# Patient Record
Sex: FEMALE | Race: WHITE | Employment: PART TIME | URBAN - NONMETROPOLITAN AREA
[De-identification: names, ages, dates, MRNs, and addresses within clinical notes are randomized per-mention and may not be internally consistent; named-entity substitution may affect disease eponyms.]

---

## 2017-08-24 ENCOUNTER — HOSPITAL ENCOUNTER (OUTPATIENT)
Dept: MAMMOGRAPHY | Age: 42
Discharge: HOME | End: 2017-08-24
Payer: COMMERCIAL

## 2017-08-24 DIAGNOSIS — Z12.39 BREAST SCREENING: ICD-10-CM

## 2017-08-24 PROCEDURE — G0202 SCR MAMMO BI INCL CAD: HCPCS

## 2018-02-05 ENCOUNTER — OFFICE VISIT (OUTPATIENT)
Dept: FAMILY MEDICINE CLINIC | Age: 43
End: 2018-02-05
Payer: MEDICAID

## 2018-02-05 VITALS
BODY MASS INDEX: 22.56 KG/M2 | TEMPERATURE: 98.2 F | RESPIRATION RATE: 14 BRPM | SYSTOLIC BLOOD PRESSURE: 98 MMHG | HEART RATE: 60 BPM | WEIGHT: 135.4 LBS | HEIGHT: 65 IN | DIASTOLIC BLOOD PRESSURE: 62 MMHG

## 2018-02-05 DIAGNOSIS — B37.9 CANDIDIASIS: ICD-10-CM

## 2018-02-05 DIAGNOSIS — Z00.00 ROUTINE PHYSICAL EXAMINATION: Primary | ICD-10-CM

## 2018-02-05 PROCEDURE — 99396 PREV VISIT EST AGE 40-64: CPT | Performed by: NURSE PRACTITIONER

## 2018-02-05 RX ORDER — FLUCONAZOLE 200 MG/1
200 TABLET ORAL DAILY
Qty: 1 TABLET | Refills: 0 | Status: SHIPPED | OUTPATIENT
Start: 2018-02-05 | End: 2018-02-19 | Stop reason: SDUPTHER

## 2018-02-05 ASSESSMENT — PATIENT HEALTH QUESTIONNAIRE - PHQ9
SUM OF ALL RESPONSES TO PHQ QUESTIONS 1-9: 0
2. FEELING DOWN, DEPRESSED OR HOPELESS: 0
SUM OF ALL RESPONSES TO PHQ9 QUESTIONS 1 & 2: 0
1. LITTLE INTEREST OR PLEASURE IN DOING THINGS: 0

## 2018-02-05 NOTE — PROGRESS NOTES
Chief Complaint:   Genia Mckinney is a 38 y.o. female who presents for complete physical examination    History of Present Illness:    Chief Complaint   Patient presents with   [address] Maintenance   Topic Date Due    HIV screen  05/29/1988    DTaP/Tdap/Td vaccine (1 - Tdap) 05/29/1992    Cervical cancer screen  05/29/1994    Flu vaccine (1) 09/01/2017    Lipid screen  10/15/2021         Pt here for yearly exam  Does complain of vaginal yeast infection  There is no problem list on file for this patient. No past medical history on file. Past Surgical History:   Procedure Laterality Date    APPENDECTOMY      CYST REMOVAL      ovary    DILATION AND CURETTAGE OF UTERUS         Current Outpatient Prescriptions   Medication Sig Dispense Refill    fluconazole (DIFLUCAN) 200 MG tablet Take 1 tablet by mouth daily for 1 day 1 tablet 0     No current facility-administered medications for this visit.       No Known Allergies    Social History     Social History    Marital status:      Spouse name: N/A    Number of children: N/A    Years of education: N/A     Social History Main Topics    Smoking status: Never Smoker    Smokeless tobacco: Not on file    Alcohol use No    Drug use: No    Sexual activity: Not on file     Other Topics Concern    Not on file     Social History Narrative    No narrative on file     Family History   Problem Relation Age of Onset    Cancer Mother      Bile Duct    Cancer Father      Cancer colon                            Review Of Systems  Skin: no abnormal pigmentation, rash, scaling, itching, masses, hair or nail changes  Eyes: no blurring, diplopia, or eye pain  Ears/Nose/Throat: no hearing loss, tinnitus, vertigo, nosebleed, nasal congestion, rhinorrhea, sore throat  Respiratory: no cough, pleuritic chest pain, dyspnea, or wheezing  Cardiovascular: no angina, MARTIN, orthopnea, PND, palpitations, or claudication  Gastrointestinal: no nausea, vomiting,

## 2018-02-18 ENCOUNTER — TELEPHONE (OUTPATIENT)
Dept: FAMILY MEDICINE CLINIC | Age: 43
End: 2018-02-18

## 2018-02-18 DIAGNOSIS — B37.9 CANDIDIASIS: ICD-10-CM

## 2018-02-19 RX ORDER — FLUCONAZOLE 200 MG/1
200 TABLET ORAL DAILY
Qty: 3 TABLET | Refills: 0 | Status: SHIPPED | OUTPATIENT
Start: 2018-02-19 | End: 2018-02-22

## 2018-08-27 ENCOUNTER — HOSPITAL ENCOUNTER (OUTPATIENT)
Dept: MAMMOGRAPHY | Age: 43
Discharge: HOME | End: 2018-08-27
Payer: COMMERCIAL

## 2018-08-27 DIAGNOSIS — Z12.39 SCREENING BREAST EXAMINATION: ICD-10-CM

## 2018-08-27 PROCEDURE — 77063 BREAST TOMOSYNTHESIS BI: CPT

## 2018-10-13 LAB
ALBUMIN SERPL-MCNC: 4.2 G/DL
ALP BLD-CCNC: 53 U/L
ALT SERPL-CCNC: 16 U/L
ANION GAP SERPL CALCULATED.3IONS-SCNC: 1.6 MMOL/L
AST SERPL-CCNC: 22 U/L
BASOPHILS ABSOLUTE: NORMAL /ΜL
BASOPHILS RELATIVE PERCENT: 0.6 %
BILIRUB SERPL-MCNC: 0.7 MG/DL (ref 0.1–1.4)
BILIRUBIN DIRECT: 0.1 MG/DL
BUN BLDV-MCNC: 19 MG/DL
CALCIUM SERPL-MCNC: 9.4 MG/DL
CHLORIDE BLD-SCNC: 105 MMOL/L
CHOLESTEROL, TOTAL: 238 MG/DL
CHOLESTEROL/HDL RATIO: 1.3
CO2: 26 MMOL/L
CREAT SERPL-MCNC: 0.8 MG/DL
EOSINOPHILS ABSOLUTE: 0.1 /ΜL
EOSINOPHILS RELATIVE PERCENT: 2.1 %
GFR CALCULATED: 78
GLUCOSE BLD-MCNC: 72 MG/DL
HCT VFR BLD CALC: 43.3 % (ref 36–46)
HDLC SERPL-MCNC: 96 MG/DL (ref 35–70)
HEMOGLOBIN: 14.7 G/DL (ref 12–16)
LDL CHOLESTEROL CALCULATED: 128 MG/DL (ref 0–160)
LYMPHOCYTES ABSOLUTE: 1.4 /ΜL
LYMPHOCYTES RELATIVE PERCENT: 34.2 %
MCH RBC QN AUTO: 32.2 PG
MCHC RBC AUTO-ENTMCNC: 34 G/DL
MCV RBC AUTO: 94.6 FL
MONOCYTES ABSOLUTE: 0.4 /ΜL
MONOCYTES RELATIVE PERCENT: 9.9 %
NEUTROPHILS ABSOLUTE: 2.2 /ΜL
NEUTROPHILS RELATIVE PERCENT: 53.2 %
PDW BLD-RTO: 13.5 %
PHOSPHORUS: 3.6 MG/DL
PLATELET # BLD: 150 K/ΜL
PMV BLD AUTO: 11 FL
POTASSIUM SERPL-SCNC: 4.2 MMOL/L
RBC # BLD: 4.58 10^6/ΜL
SODIUM BLD-SCNC: 140 MMOL/L
TOTAL PROTEIN: 6.8
TRIGL SERPL-MCNC: 72 MG/DL
VLDLC SERPL CALC-MCNC: 14 MG/DL
WBC # BLD: 4.1 10^3/ML

## 2019-03-28 ENCOUNTER — TELEPHONE (OUTPATIENT)
Dept: FAMILY MEDICINE CLINIC | Age: 44
End: 2019-03-28

## 2019-04-01 ENCOUNTER — OFFICE VISIT (OUTPATIENT)
Dept: FAMILY MEDICINE CLINIC | Age: 44
End: 2019-04-01
Payer: COMMERCIAL

## 2019-04-01 VITALS
RESPIRATION RATE: 16 BRPM | OXYGEN SATURATION: 99 % | BODY MASS INDEX: 22.16 KG/M2 | DIASTOLIC BLOOD PRESSURE: 68 MMHG | SYSTOLIC BLOOD PRESSURE: 104 MMHG | WEIGHT: 133 LBS | TEMPERATURE: 97.4 F | HEART RATE: 74 BPM | HEIGHT: 65 IN

## 2019-04-01 DIAGNOSIS — Z00.00 ROUTINE PHYSICAL EXAMINATION: Primary | ICD-10-CM

## 2019-04-01 DIAGNOSIS — D69.6 THROMBOCYTOPENIA (HCC): ICD-10-CM

## 2019-04-01 PROCEDURE — 99396 PREV VISIT EST AGE 40-64: CPT | Performed by: NURSE PRACTITIONER

## 2019-04-01 PROCEDURE — 90471 IMMUNIZATION ADMIN: CPT | Performed by: NURSE PRACTITIONER

## 2019-04-01 PROCEDURE — 90715 TDAP VACCINE 7 YRS/> IM: CPT | Performed by: NURSE PRACTITIONER

## 2019-04-01 ASSESSMENT — PATIENT HEALTH QUESTIONNAIRE - PHQ9
SUM OF ALL RESPONSES TO PHQ QUESTIONS 1-9: 0
SUM OF ALL RESPONSES TO PHQ9 QUESTIONS 1 & 2: 0
2. FEELING DOWN, DEPRESSED OR HOPELESS: 0
SUM OF ALL RESPONSES TO PHQ QUESTIONS 1-9: 0
1. LITTLE INTEREST OR PLEASURE IN DOING THINGS: 0

## 2019-04-01 NOTE — PROGRESS NOTES
Narrative    None     Family History   Problem Relation Age of Onset    Cancer Mother         Bile Duct    Cancer Father         Cancer colon                            Review Of Systems  Skin: no abnormal pigmentation, rash, scaling, itching, masses, hair or nail changes  Eyes: no blurring, diplopia, or eye pain  Ears/Nose/Throat: no hearing loss, tinnitus, vertigo, nosebleed, nasal congestion, rhinorrhea, sore throat  Respiratory: no cough, pleuritic chest pain, dyspnea, or wheezing  Cardiovascular: no angina, MARTIN, orthopnea, PND, palpitations, or claudication  Gastrointestinal: no nausea, vomiting, heartburn, diarrhea, constipation, bloating, or abdominal pain  Genitourinary: no urinary urgency, frequency, dysuria, nocturia, hesitancy, or incontinence  Musculoskeletal: no arthritis, arthralgia, myalgia, weakness, or morning stiffness  Neurologic: no paralysis, paresis, paresthesia, seizures, tremors, or headaches  Hematologic/Lymphatic/Immunologic: no anemia, abnormal bleeding/bruising, fever, chills, night sweats, swollen glands, or unexplained weight loss  Endocrine: no heat or cold intolerance and no polyphagia, polydipsia, or polyuria  Current Complaints: reviewed recent labs which has a low platelets and low wBC  This has been present for about 5 years  No known cause. She feels good. Personal Health Questionnaire Reviewed: yes. Objective:       PHYSICAL EXAMINATION:  /68 (Site: Left Upper Arm, Position: Sitting, Cuff Size: Medium Adult)   Pulse 74   Temp 97.4 °F (36.3 °C) (Temporal)   Resp 16   Ht 5' 5\" (1.651 m)   Wt 133 lb (60.3 kg)   SpO2 99%   BMI 22.13 kg/m²   General appearance: healthy, alert, no distress  Skin: Skin color, texture, turgor normal. No rashes or lesions. No induration or tightening palpated. Head: Normocephalic. No masses, lesions, tenderness or abnormalities  Eyes: conjunctivae/corneas clear. PERRL, EOM's intact.  Fundi are normal, no papilledema,

## 2019-04-04 ENCOUNTER — TELEPHONE (OUTPATIENT)
Dept: FAMILY MEDICINE CLINIC | Age: 44
End: 2019-04-04

## 2019-04-04 ENCOUNTER — HOSPITAL ENCOUNTER (OUTPATIENT)
Dept: ULTRASOUND IMAGING | Age: 44
Discharge: HOME | End: 2019-04-04
Payer: COMMERCIAL

## 2019-04-04 DIAGNOSIS — D69.6 THROMBOCYTOPENIA (HCC): Primary | ICD-10-CM

## 2019-04-04 DIAGNOSIS — D69.6 THROMBOCYTOPENIA (HCC): ICD-10-CM

## 2019-04-04 PROCEDURE — 76705 ECHO EXAM OF ABDOMEN: CPT

## 2019-04-25 ENCOUNTER — OFFICE VISIT (OUTPATIENT)
Dept: ONCOLOGY | Age: 44
End: 2019-04-25
Payer: COMMERCIAL

## 2019-04-25 ENCOUNTER — HOSPITAL ENCOUNTER (OUTPATIENT)
Dept: INFUSION THERAPY | Age: 44
Discharge: HOME | End: 2019-04-25
Payer: COMMERCIAL

## 2019-04-25 VITALS
SYSTOLIC BLOOD PRESSURE: 118 MMHG | HEIGHT: 65 IN | DIASTOLIC BLOOD PRESSURE: 57 MMHG | OXYGEN SATURATION: 99 % | RESPIRATION RATE: 16 BRPM | HEART RATE: 75 BPM | BODY MASS INDEX: 22.33 KG/M2 | TEMPERATURE: 98.1 F | WEIGHT: 134 LBS

## 2019-04-25 DIAGNOSIS — D70.8 OTHER NEUTROPENIA (HCC): Primary | ICD-10-CM

## 2019-04-25 DIAGNOSIS — D69.6 THROMBOCYTOPENIA (HCC): ICD-10-CM

## 2019-04-25 PROCEDURE — 99211 OFF/OP EST MAY X REQ PHY/QHP: CPT

## 2019-04-25 PROCEDURE — G8427 DOCREV CUR MEDS BY ELIG CLIN: HCPCS | Performed by: INTERNAL MEDICINE

## 2019-04-25 PROCEDURE — G8420 CALC BMI NORM PARAMETERS: HCPCS | Performed by: INTERNAL MEDICINE

## 2019-04-25 PROCEDURE — 99203 OFFICE O/P NEW LOW 30 MIN: CPT | Performed by: INTERNAL MEDICINE

## 2019-04-25 PROCEDURE — 1036F TOBACCO NON-USER: CPT | Performed by: INTERNAL MEDICINE

## 2019-04-25 NOTE — PATIENT INSTRUCTIONS
1.  Flow cytometry for peripheral blood  2. Dr. Cat Davis will call the patient with report  3.  RTC PRN

## 2019-04-25 NOTE — PROGRESS NOTES
[facility] [address] [place] [address], Drema Franklin Gia Meeksjay Rueda  Dept: [phone]  Dept Fax: [phone]: [phone]    Visit Date:4/25/2019     Kevon Biswas is a 37 y.o. female who presents today for:   Chief Complaint   Patient presents with    New Patient     Thrombocytopenia        HPI: HPI     This is a 49-year-old patient referred to the medical oncology/hematology clinic with new onset mild thrombocytopenia and neutropenia. Her CBC in October 2018 showed normal WBC 4.1, normal hemoglobin 14.7 and normal platelet count 278,164. CBC on March 28, 2019 showed WBC 2.7, ANC  1.6, hemoglobin 13.3 and a platelet count 282. The patient denies any recent infections, she was not placed on any new medications. She denies having any active bleeding. She is not a smoker, denies alcohol intake, no weight loss. History reviewed. No pertinent past medical history. Past Surgical History:   Procedure Laterality Date    APPENDECTOMY      CYST REMOVAL      ovary    DILATION AND CURETTAGE OF UTERUS        Family History   Problem Relation Age of Onset    Cancer Mother         Bile Duct    Cancer Father         Cancer colon      Social History     Tobacco Use    Smoking status: Never Smoker    Smokeless tobacco: Never Used   Substance Use Topics    Alcohol use: No      No current outpatient medications on file. No current facility-administered medications for this visit. No Known Allergies   Health Maintenance   Topic Date Due    Pneumococcal 0-64 years Vaccine (1 of 3 - PCV13) 05/29/1979    HIV screen  05/29/1988    Cervical cancer screen  05/29/1994    Flu vaccine (Season Ended) 09/01/2019    Lipid screen  03/28/2024    DTaP/Tdap/Td vaccine (2 - Td) 04/01/2029        Subjective:   Review of Systems   Constitutional: Negative for activity change, appetite change, fatigue and fever.    HENT: Negative for congestion, dental problem, facial swelling, hearing loss, exercise. Patient agreed with treatment plan. Follow up as directed.     Electronically signed by Clara Galeas MD on 4/25/19 at 2:11 PM

## 2019-04-26 ENCOUNTER — NURSE ONLY (OUTPATIENT)
Dept: LAB | Age: 44
End: 2019-04-26

## 2019-04-26 DIAGNOSIS — D70.4 CYCLICAL NEUTROPENIA (HCC): ICD-10-CM

## 2019-04-28 LAB — LEUK/LYMPH PHENOTYPING WB: NORMAL

## 2019-05-10 ENCOUNTER — TELEPHONE (OUTPATIENT)
Dept: ONCOLOGY | Age: 44
End: 2019-05-10

## 2019-05-14 DIAGNOSIS — D70.8 OTHER NEUTROPENIA (HCC): ICD-10-CM

## 2019-05-14 DIAGNOSIS — D69.6 THROMBOCYTOPENIA (HCC): Primary | ICD-10-CM

## 2019-05-14 ASSESSMENT — ENCOUNTER SYMPTOMS
VOMITING: 0
BLOOD IN STOOL: 0
BACK PAIN: 0
CHEST TIGHTNESS: 0
FACIAL SWELLING: 0
DIARRHEA: 0
COLOR CHANGE: 0
NAUSEA: 0
EYE DISCHARGE: 0
WHEEZING: 0
ABDOMINAL DISTENTION: 0
ABDOMINAL PAIN: 0
RECTAL PAIN: 0
CONSTIPATION: 0
TROUBLE SWALLOWING: 0
COUGH: 0
SORE THROAT: 0
SHORTNESS OF BREATH: 0

## 2019-06-17 ENCOUNTER — NURSE ONLY (OUTPATIENT)
Dept: LAB | Age: 44
End: 2019-06-17

## 2019-06-17 DIAGNOSIS — D70.8 OTHER NEUTROPENIA (HCC): ICD-10-CM

## 2019-06-17 DIAGNOSIS — D69.6 THROMBOCYTOPENIA (HCC): ICD-10-CM

## 2019-06-17 LAB
BASOPHILS # BLD: 0.6 %
BASOPHILS ABSOLUTE: 0 THOU/MM3 (ref 0–0.1)
EOSINOPHIL # BLD: 0.6 %
EOSINOPHILS ABSOLUTE: 0 THOU/MM3 (ref 0–0.4)
ERYTHROCYTE [DISTWIDTH] IN BLOOD BY AUTOMATED COUNT: 13 % (ref 11.5–14.5)
ERYTHROCYTE [DISTWIDTH] IN BLOOD BY AUTOMATED COUNT: 48.6 FL (ref 35–45)
HCT VFR BLD CALC: 41.3 % (ref 37–47)
HEMOGLOBIN: 13.2 GM/DL (ref 12–16)
IMMATURE GRANS (ABS): 0.01 THOU/MM3 (ref 0–0.07)
IMMATURE GRANULOCYTES: 0.1 %
LYMPHOCYTES # BLD: 16.9 %
LYMPHOCYTES ABSOLUTE: 1.1 THOU/MM3 (ref 1–4.8)
MCH RBC QN AUTO: 32.5 PG (ref 26–33)
MCHC RBC AUTO-ENTMCNC: 32 GM/DL (ref 32.2–35.5)
MCV RBC AUTO: 101.7 FL (ref 81–99)
MONOCYTES # BLD: 7.9 %
MONOCYTES ABSOLUTE: 0.5 THOU/MM3 (ref 0.4–1.3)
NUCLEATED RED BLOOD CELLS: 0 /100 WBC
PLATELET # BLD: 163 THOU/MM3 (ref 130–400)
PMV BLD AUTO: 12.8 FL (ref 9.4–12.4)
RBC # BLD: 4.06 MILL/MM3 (ref 4.2–5.4)
SEG NEUTROPHILS: 73.9 %
SEGMENTED NEUTROPHILS ABSOLUTE COUNT: 5 THOU/MM3 (ref 1.8–7.7)
WBC # BLD: 6.7 THOU/MM3 (ref 4.8–10.8)

## 2019-09-11 ENCOUNTER — HOSPITAL ENCOUNTER (OUTPATIENT)
Dept: MAMMOGRAPHY | Age: 44
Discharge: HOME | End: 2019-09-11
Payer: COMMERCIAL

## 2019-09-11 DIAGNOSIS — Z12.39 SCREENING BREAST EXAMINATION: ICD-10-CM

## 2019-09-11 PROCEDURE — 77063 BREAST TOMOSYNTHESIS BI: CPT

## 2019-11-08 ENCOUNTER — NURSE ONLY (OUTPATIENT)
Dept: LAB | Age: 44
End: 2019-11-08

## 2019-11-08 LAB
ALBUMIN SERPL-MCNC: 4.3 G/DL (ref 3.5–5.1)
ALP BLD-CCNC: 63 U/L (ref 38–126)
ALT SERPL-CCNC: 14 U/L (ref 11–66)
ANION GAP SERPL CALCULATED.3IONS-SCNC: 14 MEQ/L (ref 8–16)
AST SERPL-CCNC: 22 U/L (ref 5–40)
BILIRUB SERPL-MCNC: 0.3 MG/DL (ref 0.3–1.2)
BILIRUBIN DIRECT: < 0.2 MG/DL (ref 0–0.3)
BUN BLDV-MCNC: 17 MG/DL (ref 7–22)
CALCIUM SERPL-MCNC: 9.8 MG/DL (ref 8.5–10.5)
CHLORIDE BLD-SCNC: 104 MEQ/L (ref 98–111)
CHOLESTEROL, TOTAL: 207 MG/DL (ref 100–199)
CO2: 24 MEQ/L (ref 23–33)
CREAT SERPL-MCNC: 0.7 MG/DL (ref 0.4–1.2)
ERYTHROCYTE [DISTWIDTH] IN BLOOD BY AUTOMATED COUNT: 13.5 % (ref 11.5–14.5)
ERYTHROCYTE [DISTWIDTH] IN BLOOD BY AUTOMATED COUNT: 49.4 FL (ref 35–45)
GFR SERPL CREATININE-BSD FRML MDRD: 90 ML/MIN/1.73M2
GLUCOSE BLD-MCNC: 100 MG/DL (ref 70–108)
HCT VFR BLD CALC: 41.5 % (ref 37–47)
HDLC SERPL-MCNC: 85 MG/DL
HEMOGLOBIN: 13.4 GM/DL (ref 12–16)
LDL CHOLESTEROL CALCULATED: 108 MG/DL
MCH RBC QN AUTO: 32 PG (ref 26–33)
MCHC RBC AUTO-ENTMCNC: 32.3 GM/DL (ref 32.2–35.5)
MCV RBC AUTO: 99 FL (ref 81–99)
PLATELET # BLD: 170 THOU/MM3 (ref 130–400)
PMV BLD AUTO: 12.5 FL (ref 9.4–12.4)
POTASSIUM SERPL-SCNC: 4.2 MEQ/L (ref 3.5–5.2)
RBC # BLD: 4.19 MILL/MM3 (ref 4.2–5.4)
SODIUM BLD-SCNC: 142 MEQ/L (ref 135–145)
TOTAL PROTEIN: 6.9 G/DL (ref 6.1–8)
TRIGL SERPL-MCNC: 69 MG/DL (ref 0–199)
WBC # BLD: 3 THOU/MM3 (ref 4.8–10.8)

## 2019-11-13 ENCOUNTER — NURSE ONLY (OUTPATIENT)
Dept: LAB | Age: 44
End: 2019-11-13

## 2019-11-29 ENCOUNTER — OFFICE VISIT (OUTPATIENT)
Dept: FAMILY MEDICINE CLINIC | Age: 44
End: 2019-11-29
Payer: COMMERCIAL

## 2019-11-29 ENCOUNTER — TELEPHONE (OUTPATIENT)
Dept: FAMILY MEDICINE CLINIC | Age: 44
End: 2019-11-29

## 2019-11-29 VITALS
TEMPERATURE: 97.2 F | SYSTOLIC BLOOD PRESSURE: 104 MMHG | WEIGHT: 136.4 LBS | HEIGHT: 65 IN | HEART RATE: 64 BPM | BODY MASS INDEX: 22.73 KG/M2 | DIASTOLIC BLOOD PRESSURE: 66 MMHG | RESPIRATION RATE: 12 BRPM

## 2019-11-29 DIAGNOSIS — J02.9 SORE THROAT: Primary | ICD-10-CM

## 2019-11-29 LAB — STREPTOCOCCUS A RNA: NEGATIVE

## 2019-11-29 PROCEDURE — G8420 CALC BMI NORM PARAMETERS: HCPCS | Performed by: NURSE PRACTITIONER

## 2019-11-29 PROCEDURE — G8427 DOCREV CUR MEDS BY ELIG CLIN: HCPCS | Performed by: NURSE PRACTITIONER

## 2019-11-29 PROCEDURE — 1036F TOBACCO NON-USER: CPT | Performed by: NURSE PRACTITIONER

## 2019-11-29 PROCEDURE — G8484 FLU IMMUNIZE NO ADMIN: HCPCS | Performed by: NURSE PRACTITIONER

## 2019-11-29 PROCEDURE — 99213 OFFICE O/P EST LOW 20 MIN: CPT | Performed by: NURSE PRACTITIONER

## 2019-11-29 PROCEDURE — 87651 STREP A DNA AMP PROBE: CPT | Performed by: NURSE PRACTITIONER

## 2019-11-29 RX ORDER — METHYLPREDNISOLONE 4 MG/1
TABLET ORAL
Qty: 1 KIT | Refills: 0 | Status: SHIPPED | OUTPATIENT
Start: 2019-11-29 | End: 2019-12-05

## 2019-11-29 ASSESSMENT — ENCOUNTER SYMPTOMS
GASTROINTESTINAL NEGATIVE: 1
SORE THROAT: 1
RESPIRATORY NEGATIVE: 1
EYES NEGATIVE: 1

## 2020-08-11 ENCOUNTER — PATIENT MESSAGE (OUTPATIENT)
Dept: FAMILY MEDICINE CLINIC | Age: 45
End: 2020-08-11

## 2020-08-11 NOTE — TELEPHONE ENCOUNTER
From: Lowell Gallo  To: , APRN - CNP  Sent: 8/11/2020 7:51 AM EDT  Subject: Non-Urgent Medical Question    Are you doing a days for blood work for a certain price anytime soon?

## 2020-09-14 ENCOUNTER — HOSPITAL ENCOUNTER (OUTPATIENT)
Dept: MAMMOGRAPHY | Age: 45
Discharge: HOME | End: 2020-09-14
Payer: COMMERCIAL

## 2020-09-14 PROCEDURE — 77063 BREAST TOMOSYNTHESIS BI: CPT

## 2020-11-21 ENCOUNTER — PATIENT MESSAGE (OUTPATIENT)
Dept: FAMILY MEDICINE CLINIC | Age: 45
End: 2020-11-21

## 2020-11-22 RX ORDER — DOXYCYCLINE HYCLATE 100 MG
100 TABLET ORAL 2 TIMES DAILY
Qty: 20 TABLET | Refills: 0 | Status: SHIPPED | OUTPATIENT
Start: 2020-11-22 | End: 2020-12-02

## 2020-11-22 NOTE — PROGRESS NOTES
Pt called with complaints of sore throat x 5 days. No fever. No cough. Reports some sinus drainage. Will send in doxycycline for the patient.  She is to follow up with pcp if symptoms persist.

## 2020-11-23 RX ORDER — AZITHROMYCIN 250 MG/1
TABLET, FILM COATED ORAL
Qty: 1 PACKET | Refills: 0 | Status: SHIPPED | OUTPATIENT
Start: 2020-11-23 | End: 2021-03-04 | Stop reason: ALTCHOICE

## 2020-11-23 NOTE — TELEPHONE ENCOUNTER
From: Sloan Crowe  To: , APRN - CNP  Sent: 11/21/2020 6:13 PM EST  Subject: Non-Urgent Medical Question    I have had a sore throat for about 5 days now. Not hardly any other symptoms didnt know if I could get something called in. Been using cough drops and NyQuil. I have been quarantining all this past week just in case and will continue.

## 2021-03-04 ENCOUNTER — OFFICE VISIT (OUTPATIENT)
Dept: FAMILY MEDICINE CLINIC | Age: 46
End: 2021-03-04
Payer: COMMERCIAL

## 2021-03-04 VITALS
BODY MASS INDEX: 18.66 KG/M2 | RESPIRATION RATE: 16 BRPM | HEIGHT: 65 IN | TEMPERATURE: 97.4 F | HEART RATE: 54 BPM | OXYGEN SATURATION: 99 % | DIASTOLIC BLOOD PRESSURE: 62 MMHG | SYSTOLIC BLOOD PRESSURE: 112 MMHG | WEIGHT: 112 LBS

## 2021-03-04 DIAGNOSIS — Z00.00 ROUTINE PHYSICAL EXAMINATION: Primary | ICD-10-CM

## 2021-03-04 PROCEDURE — 99396 PREV VISIT EST AGE 40-64: CPT | Performed by: NURSE PRACTITIONER

## 2021-03-04 PROCEDURE — G8484 FLU IMMUNIZE NO ADMIN: HCPCS | Performed by: NURSE PRACTITIONER

## 2021-03-04 SDOH — ECONOMIC STABILITY: TRANSPORTATION INSECURITY
IN THE PAST 12 MONTHS, HAS THE LACK OF TRANSPORTATION KEPT YOU FROM MEDICAL APPOINTMENTS OR FROM GETTING MEDICATIONS?: NOT ASKED

## 2021-03-04 SDOH — ECONOMIC STABILITY: TRANSPORTATION INSECURITY
IN THE PAST 12 MONTHS, HAS LACK OF TRANSPORTATION KEPT YOU FROM MEETINGS, WORK, OR FROM GETTING THINGS NEEDED FOR DAILY LIVING?: NOT ASKED

## 2021-03-04 ASSESSMENT — PATIENT HEALTH QUESTIONNAIRE - PHQ9
1. LITTLE INTEREST OR PLEASURE IN DOING THINGS: 0
SUM OF ALL RESPONSES TO PHQ9 QUESTIONS 1 & 2: 0
SUM OF ALL RESPONSES TO PHQ QUESTIONS 1-9: 0
SUM OF ALL RESPONSES TO PHQ QUESTIONS 1-9: 0

## 2021-03-10 ENCOUNTER — TELEPHONE (OUTPATIENT)
Dept: ADMINISTRATIVE | Age: 46
End: 2021-03-10

## 2021-03-10 NOTE — TELEPHONE ENCOUNTER
Caller Name: Musa Mullen    Relation to Patient: self    Call Back Phone Number: [phone]    Has the patient received a COVID vaccine? no    If yes, when and where: n/a    Question/Concern: Patient is able to get the vaccine tomorrow but is wanting to have Dr. Jermaine George give her the clearance due to her illnesses and immunity she wants to be sure that it is ok to get it.

## 2021-04-30 NOTE — PROGRESS NOTES
Chief Complaint:   Kaylee Ch is a 50 y.o. female who presents for complete physical examination    History of Present Illness:    Chief Complaint   Patient presents with   [address] Maintenance   Topic Date Due    Cervical cancer screen  Never done    Flu vaccine (1) Never done    Lipid screen  02/25/2025    DTaP/Tdap/Td vaccine (2 - Td) 04/01/2029    Hepatitis A vaccine  Aged Out    Hepatitis B vaccine  Aged Out    Hib vaccine  Aged Out    Meningococcal (ACWY) vaccine  Aged Out    Pneumococcal 0-64 years Vaccine  Aged Out    Hepatitis C screen  Discontinued    HIV screen  Discontinued     Pt here for routine physical.  Overall she feels well. Labs were stable. She has history of leukopenia unchanged. She has lost weight but reports she was in a weight loss challenge. Advised to be careful to not lose more weight       There is no problem list on file for this patient. No past medical history on file. Past Surgical History:   Procedure Laterality Date    APPENDECTOMY      CYST REMOVAL      ovary    DILATION AND CURETTAGE OF UTERUS         No current outpatient medications on file. No current facility-administered medications for this visit.       No Known Allergies    Social History     Socioeconomic History    Marital status:      Spouse name: None    Number of children: None    Years of education: None    Highest education level: None   Occupational History    None   Social Needs    Financial resource strain: Not hard at all   [email or web address] insecurity     Worry: Never true     Inability: Never true   [employer] needs     Medical: None     Non-medical: None   Tobacco Use    Smoking status: Never Smoker    Smokeless tobacco: Never Used   Substance and Sexual Activity    Alcohol use: No    Drug use: No    Sexual activity: None   Lifestyle    Physical activity     Days per week: None     Minutes per session: None    Stress: None   Relationships    Social Left message for pt's mom, Pat, to call back.  The new [employer] form requests more information so pt will need to be seen.     connections     Talks on phone: None     Gets together: None     Attends Religion service: None     Active member of club or organization: None     Attends meetings of clubs or organizations: None     Relationship status: None    Intimate partner violence     Fear of current or ex partner: None     Emotionally abused: None     Physically abused: None     Forced sexual activity: None   Other Topics Concern    None   Social History Narrative    None     Family History   Problem Relation Age of Onset    Cancer Mother         Bile Duct    Cancer Father         Cancer colon                            Review Of Systems  Skin: no abnormal pigmentation, rash, scaling, itching, masses, hair or nail changes  Eyes: no blurring, diplopia, or eye pain  Ears/Nose/Throat: no hearing loss, tinnitus, vertigo, nosebleed, nasal congestion, rhinorrhea, sore throat  Respiratory: no cough, pleuritic chest pain, dyspnea, or wheezing  Cardiovascular: no angina, MARTIN, orthopnea, PND, palpitations, or claudication  Gastrointestinal: no nausea, vomiting, heartburn, diarrhea, constipation, bloating, or abdominal pain  Genitourinary: no urinary urgency, frequency, dysuria, nocturia, hesitancy, or incontinence  Musculoskeletal: no arthritis, arthralgia, myalgia, weakness, or morning stiffness  Neurologic: no paralysis, paresis, paresthesia, seizures, tremors, or headaches  Hematologic/Lymphatic/Immunologic: no anemia, abnormal bleeding/bruising, fever, chills, night sweats, swollen glands, or unexplained weight loss  Endocrine: no heat or cold intolerance and no polyphagia, polydipsia, or polyuria  Current Complaints: . Personal Health Questionnaire Reviewed: yes.           Objective:       PHYSICAL EXAMINATION:  /62 (Site: Right Upper Arm, Position: Sitting, Cuff Size: Medium Adult)   Pulse 54   Temp 97.4 °F (36.3 °C) (Temporal)   Resp 16   Ht 5' 5\" (1.651 m)   Wt 112 lb (50.8 kg)   SpO2 99%   BMI 18.64 kg/m²   General appearance: healthy, alert, no distress  Skin: Skin color, texture, turgor normal. No rashes or lesions. No induration or tightening palpated. Head: Normocephalic. No masses, lesions, tenderness or abnormalities  Eyes: conjunctivae/corneas clear. PERRL, EOM's intact. Fundi are normal, no papilledema, hemorrhages or exudates. No AV crossing changes are noted. Ears: External ears normal. Canals clear. TM's clear bilaterally. Hearing normal to finger rub. Nose/Sinuses: Nares normal. Septum midline. Mucosa normal. No drainage or sinus tenderness. Oropharynx: Lips, mucosa, and tongue normal. Teeth and gums normal. Oropharynx clear with no exudate seen. Neck: Neck supple, and symmetric. Multiple small shoddy nodes in neck. Back: Back symmetric, no curvature. ROM normal. No CVA tenderness. Lungs: Good diaphragmatic excursion. Lungs clear to auscultation bilaterally. No retractions or use of accessory muscles. No tactile fremitus. Normal chest percussion. Heart: PMI is not displaced, and no thrill noted. Regular rate and rhythm, with no rub, murmur or gallop noted. Abdomen: Abdomen soft, non-tender. BS normal. No masses, organomegaly. No hernia noted. Extremities: Extremities normal. No deformities, edema, or skin discoloration. No cyanosis or clubbing noted to the nails. Lymph: No lymphadenopathy of the neck or supraclavicular regions. Musculoskeletal: Spine ROM normal. Muscular strength intact. Peripheral pulses: radial=4/4, femoral=4/4, dorsalis pedis=4/4,  Neuro: Cranial nerves intact, Gait normal. Reflexes normal and symmetric, with no pathologic reflex noted. No focal weakness. Normal sensation to light touch.   No components found for: CHLPL  Lab Results   Component Value Date    TRIG 69 02/25/2020    TRIG 69 11/08/2019    TRIG 74 03/28/2019     Lab Results   Component Value Date    HDL 79 02/25/2020    HDL 85 11/08/2019    HDL 70 03/28/2019     Lab Results   Component Value Date    LDLCALC 108 11/08/2019    LDLCALC 128 10/13/2018    LDLCALC 127 10/15/2016     No results found for: LABVLDL  Lab Results   Component Value Date    WBC 2.6 (L) 02/25/2020    HGB 13.1 02/25/2020    HCT 39.2 02/25/2020    MCV 94.3 02/25/2020     (L) 02/25/2020       Chemistry        Component Value Date/Time     02/25/2020 0725    K 3.7 02/25/2020 0725     02/25/2020 0725    CO2 31 02/25/2020 0725    BUN 14 02/25/2020 0725    CREATININE 0.81 02/25/2020 0725        Component Value Date/Time    CALCIUM 9.50 02/25/2020 0725    ALKPHOS 53 02/25/2020 0725    AST 25 02/25/2020 0725    ALT 20 02/25/2020 0725    BILITOT 0.5 02/25/2020 0725            Assessment:      Diagnosis Orders   1. Routine physical examination            Plan:    Education Reviewed and Recommended: Low Fat, Low Cholesterol Diet, Skin Cancer Screening, Depression Evaluation    Advised on preventative health maintenance guidelines and schedules to be completed. reviewed appropriate vaccines. Pt refused none. Orders per enc. To call with any questions or concerns.

## 2021-05-13 ENCOUNTER — PATIENT MESSAGE (OUTPATIENT)
Dept: FAMILY MEDICINE CLINIC | Age: 46
End: 2021-05-13

## 2021-05-13 RX ORDER — AMOXICILLIN 500 MG/1
500 CAPSULE ORAL 3 TIMES DAILY
Qty: 30 CAPSULE | Refills: 0 | Status: SHIPPED | OUTPATIENT
Start: 2021-05-13 | End: 2021-05-23

## 2021-05-13 NOTE — TELEPHONE ENCOUNTER
Spoke with patient. She states her ST started 5/8, is worse in the morning, hurts to swallow. Denies have cough, vera, drainage, fever, HA, no other symptoms.   Has been taking Advil 400 in am, cough drops and OTC sore throat syrup-all helps a little but ST comes back

## 2021-05-13 NOTE — TELEPHONE ENCOUNTER
From: Lorie Spencer  To: REX Ferrer - CNP  Sent: 5/13/2021 7:37 AM EDT  Subject: Non-Urgent Medical Question    Consuelo Mcgill,    I have had a sore throat all this week and didn't know if there is anything I could get for this. I go to [facility] if there is anything I could get called in or not.     Thanks,    Sreekanth Rolon

## 2021-10-25 ENCOUNTER — HOSPITAL ENCOUNTER (OUTPATIENT)
Dept: MAMMOGRAPHY | Age: 46
Discharge: HOME | End: 2021-10-25
Payer: COMMERCIAL

## 2021-10-25 DIAGNOSIS — Z12.39 SCREENING BREAST EXAMINATION: ICD-10-CM

## 2021-10-25 PROCEDURE — 77063 BREAST TOMOSYNTHESIS BI: CPT

## 2021-10-28 ENCOUNTER — HOSPITAL ENCOUNTER (OUTPATIENT)
Dept: WOMENS IMAGING | Age: 46
Discharge: HOME | End: 2021-10-28
Payer: COMMERCIAL

## 2021-10-28 DIAGNOSIS — R92.2 BREAST DENSITY: ICD-10-CM

## 2021-10-28 PROCEDURE — 76642 ULTRASOUND BREAST LIMITED: CPT

## 2022-10-27 ENCOUNTER — HOSPITAL ENCOUNTER (OUTPATIENT)
Dept: MAMMOGRAPHY | Age: 47
Discharge: HOME | End: 2022-10-27
Payer: COMMERCIAL

## 2022-10-27 DIAGNOSIS — Z12.39 BREAST SCREENING: ICD-10-CM

## 2022-10-27 PROCEDURE — 77067 SCR MAMMO BI INCL CAD: CPT

## 2022-11-29 ENCOUNTER — NURSE ONLY (OUTPATIENT)
Dept: LAB | Age: 47
End: 2022-11-29

## 2023-02-16 ENCOUNTER — PATIENT MESSAGE (OUTPATIENT)
Dept: FAMILY MEDICINE CLINIC | Age: 48
End: 2023-02-16

## 2023-02-16 RX ORDER — AMOXICILLIN 500 MG/1
500 CAPSULE ORAL 3 TIMES DAILY
Qty: 30 CAPSULE | Refills: 0 | Status: SHIPPED | OUTPATIENT
Start: 2023-02-16 | End: 2023-02-26

## 2023-02-16 NOTE — TELEPHONE ENCOUNTER
From: Lurdes Hong  To: Prince Olmstead  Sent: 2/16/2023 11:11 AM EST  Subject: Sore throat    I have had a sore throat the last few days that seems to be getting worse. No cough or runny nose. Just going out of town later today and was wondering about getting something called in before I leave.   Thank you  Lyric Frausto

## 2023-02-20 RX ORDER — PREDNISONE 20 MG/1
40 TABLET ORAL DAILY
Qty: 10 TABLET | Refills: 0 | Status: SHIPPED | OUTPATIENT
Start: 2023-02-20

## 2023-04-12 ENCOUNTER — HOSPITAL ENCOUNTER (OUTPATIENT)
Age: 48
Discharge: HOME | End: 2023-04-12
Payer: COMMERCIAL

## 2023-04-12 LAB
DEPRECATED MEAN GLUCOSE BLD GHB EST-ACNC: 90 MG/DL (ref 70–126)
FERRITIN SERPL IA-MCNC: 75 NG/ML (ref 10–291)
HBA1C MFR BLD HPLC: 5 % (ref 4.4–6.4)
INSULIN SERPL-ACNC: 3.9 MU/L
T3FREE SERPL-MCNC: 2.28 PG/ML (ref 2.02–4.43)
T4 FREE SERPL-MCNC: 0.82 NG/DL (ref 0.93–1.76)
TSH SERPL DL<=0.005 MIU/L-ACNC: 2.03 UIU/ML (ref 0.4–4.2)
VIT B12 SERPL-MCNC: 638 PG/ML (ref 211–911)

## 2023-04-12 PROCEDURE — 84480 ASSAY TRIIODOTHYRONINE (T3): CPT

## 2023-04-12 PROCEDURE — 84436 ASSAY OF TOTAL THYROXINE: CPT

## 2023-04-12 PROCEDURE — 84481 FREE ASSAY (FT-3): CPT

## 2023-04-12 PROCEDURE — 83036 HEMOGLOBIN GLYCOSYLATED A1C: CPT

## 2023-04-12 PROCEDURE — 84439 ASSAY OF FREE THYROXINE: CPT

## 2023-04-12 PROCEDURE — 84443 ASSAY THYROID STIM HORMONE: CPT

## 2023-04-12 PROCEDURE — 82306 VITAMIN D 25 HYDROXY: CPT

## 2023-04-12 PROCEDURE — 82728 ASSAY OF FERRITIN: CPT

## 2023-04-12 PROCEDURE — 82607 VITAMIN B-12: CPT

## 2023-04-12 PROCEDURE — 83525 ASSAY OF INSULIN: CPT

## 2023-04-13 LAB
T3 TOTAL: 90 NG/DL (ref 60–181)
T4 SERPL-MCNC: 4.4 UG/DL (ref 4.5–10.9)

## 2023-04-16 LAB — DEPRECATED CALCIDIOL+CALCIFEROL SERPL-MC: NORMAL NG/ML

## 2023-08-08 ENCOUNTER — OFFICE VISIT (OUTPATIENT)
Dept: FAMILY MEDICINE CLINIC | Age: 48
End: 2023-08-08
Payer: COMMERCIAL

## 2023-08-08 VITALS
BODY MASS INDEX: 19.99 KG/M2 | SYSTOLIC BLOOD PRESSURE: 108 MMHG | DIASTOLIC BLOOD PRESSURE: 70 MMHG | HEIGHT: 65 IN | HEART RATE: 55 BPM | TEMPERATURE: 97.4 F | RESPIRATION RATE: 16 BRPM | WEIGHT: 120 LBS | OXYGEN SATURATION: 98 %

## 2023-08-08 DIAGNOSIS — B96.89 ACUTE BACTERIAL SINUSITIS: ICD-10-CM

## 2023-08-08 DIAGNOSIS — J01.90 ACUTE BACTERIAL SINUSITIS: ICD-10-CM

## 2023-08-08 PROCEDURE — 1036F TOBACCO NON-USER: CPT | Performed by: NURSE PRACTITIONER

## 2023-08-08 PROCEDURE — 3017F COLORECTAL CA SCREEN DOC REV: CPT | Performed by: NURSE PRACTITIONER

## 2023-08-08 PROCEDURE — G8420 CALC BMI NORM PARAMETERS: HCPCS | Performed by: NURSE PRACTITIONER

## 2023-08-08 PROCEDURE — G8427 DOCREV CUR MEDS BY ELIG CLIN: HCPCS | Performed by: NURSE PRACTITIONER

## 2023-08-08 PROCEDURE — 99213 OFFICE O/P EST LOW 20 MIN: CPT | Performed by: NURSE PRACTITIONER

## 2023-08-08 RX ORDER — AZITHROMYCIN 250 MG/1
TABLET, FILM COATED ORAL
Qty: 1 PACKET | Refills: 0 | Status: SHIPPED | OUTPATIENT
Start: 2023-08-08

## 2023-08-08 SDOH — ECONOMIC STABILITY: FOOD INSECURITY: WITHIN THE PAST 12 MONTHS, YOU WORRIED THAT YOUR FOOD WOULD RUN OUT BEFORE YOU GOT MONEY TO BUY MORE.: NEVER TRUE

## 2023-08-08 SDOH — ECONOMIC STABILITY: INCOME INSECURITY: HOW HARD IS IT FOR YOU TO PAY FOR THE VERY BASICS LIKE FOOD, HOUSING, MEDICAL CARE, AND HEATING?: NOT HARD AT ALL

## 2023-08-08 SDOH — ECONOMIC STABILITY: TRANSPORTATION INSECURITY
IN THE PAST 12 MONTHS, HAS LACK OF TRANSPORTATION KEPT YOU FROM MEETINGS, WORK, OR FROM GETTING THINGS NEEDED FOR DAILY LIVING?: NO

## 2023-08-08 SDOH — ECONOMIC STABILITY: FOOD INSECURITY: WITHIN THE PAST 12 MONTHS, THE FOOD YOU BOUGHT JUST DIDN'T LAST AND YOU DIDN'T HAVE MONEY TO GET MORE.: NEVER TRUE

## 2023-08-08 SDOH — ECONOMIC STABILITY: HOUSING INSECURITY
IN THE LAST 12 MONTHS, WAS THERE A TIME WHEN YOU DID NOT HAVE A STEADY PLACE TO SLEEP OR SLEPT IN A SHELTER (INCLUDING NOW)?: NO

## 2023-08-08 ASSESSMENT — PATIENT HEALTH QUESTIONNAIRE - PHQ9
1. LITTLE INTEREST OR PLEASURE IN DOING THINGS: 0
2. FEELING DOWN, DEPRESSED OR HOPELESS: 0
SUM OF ALL RESPONSES TO PHQ9 QUESTIONS 1 & 2: 0
SUM OF ALL RESPONSES TO PHQ9 QUESTIONS 1 & 2: 0
1. LITTLE INTEREST OR PLEASURE IN DOING THINGS: NOT AT ALL
2. FEELING DOWN, DEPRESSED OR HOPELESS: NOT AT ALL
SUM OF ALL RESPONSES TO PHQ QUESTIONS 1-9: 0

## 2023-08-08 ASSESSMENT — ENCOUNTER SYMPTOMS
COUGH: 1
GASTROINTESTINAL NEGATIVE: 1
EYES NEGATIVE: 1
SORE THROAT: 1

## 2023-10-30 ENCOUNTER — HOSPITAL ENCOUNTER (OUTPATIENT)
Dept: MAMMOGRAPHY | Age: 48
Discharge: HOME | End: 2023-10-30
Payer: COMMERCIAL

## 2023-10-30 VITALS — WEIGHT: 120 LBS | HEIGHT: 65 IN | BODY MASS INDEX: 19.99 KG/M2

## 2023-10-30 DIAGNOSIS — Z12.39 BREAST SCREENING: ICD-10-CM

## 2023-10-30 PROCEDURE — 77063 BREAST TOMOSYNTHESIS BI: CPT

## 2024-11-06 ENCOUNTER — HOSPITAL ENCOUNTER (OUTPATIENT)
Dept: MAMMOGRAPHY | Age: 49
Discharge: HOME | End: 2024-11-06
Payer: COMMERCIAL

## 2024-11-06 VITALS — BODY MASS INDEX: 18.78 KG/M2 | WEIGHT: 110 LBS | HEIGHT: 64 IN

## 2024-11-06 DIAGNOSIS — Z12.39 BREAST SCREENING: ICD-10-CM

## 2024-11-06 PROCEDURE — 77063 BREAST TOMOSYNTHESIS BI: CPT

## 2025-02-13 ENCOUNTER — PATIENT MESSAGE (OUTPATIENT)
Dept: FAMILY MEDICINE CLINIC | Age: 50
End: 2025-02-13

## 2025-02-13 RX ORDER — OSELTAMIVIR PHOSPHATE 75 MG/1
75 CAPSULE ORAL 2 TIMES DAILY
Qty: 10 CAPSULE | Refills: 0 | Status: SHIPPED | OUTPATIENT
Start: 2025-02-13 | End: 2025-02-18